# Patient Record
Sex: MALE | Race: BLACK OR AFRICAN AMERICAN | NOT HISPANIC OR LATINO | URBAN - METROPOLITAN AREA
[De-identification: names, ages, dates, MRNs, and addresses within clinical notes are randomized per-mention and may not be internally consistent; named-entity substitution may affect disease eponyms.]

---

## 2018-01-08 ENCOUNTER — EMERGENCY (EMERGENCY)
Facility: HOSPITAL | Age: 23
LOS: 1 days | Discharge: HOME | End: 2018-01-08
Admitting: EMERGENCY MEDICINE
Payer: MEDICAID

## 2018-01-08 VITALS
SYSTOLIC BLOOD PRESSURE: 147 MMHG | TEMPERATURE: 98 F | DIASTOLIC BLOOD PRESSURE: 77 MMHG | OXYGEN SATURATION: 98 % | HEART RATE: 60 BPM | RESPIRATION RATE: 18 BRPM

## 2018-01-08 PROCEDURE — 93010 ELECTROCARDIOGRAM REPORT: CPT | Mod: 59

## 2018-01-08 PROCEDURE — 99284 EMERGENCY DEPT VISIT MOD MDM: CPT | Mod: 25

## 2018-01-08 PROCEDURE — 71046 X-RAY EXAM CHEST 2 VIEWS: CPT | Mod: 26

## 2018-01-08 RX ORDER — IBUPROFEN 200 MG
600 TABLET ORAL ONCE
Qty: 0 | Refills: 0 | Status: COMPLETED | OUTPATIENT
Start: 2018-01-08 | End: 2018-01-08

## 2018-01-08 RX ADMIN — Medication 600 MILLIGRAM(S): at 22:14

## 2018-01-08 NOTE — ED PROVIDER NOTE - PROGRESS NOTE DETAILS
Pt remains stable in ED, ekg and cxr unremarkable, likely muscle strain vs costochondritis, pt to f/u with PMD in 2-3 days for post hospital visit.

## 2018-01-08 NOTE — ED PROVIDER NOTE - CARDIAC, MLM
Normal rate, regular rhythm.  Heart sounds S1, S2.  No murmurs, rubs or gallops, no le edema b/l, + lower sternal ttp also reproducible with lateral rotation of trunk., + ttp to right anterior lower intercostal space, no bony deformity noted.

## 2018-01-08 NOTE — ED PROVIDER NOTE - MEDICAL DECISION MAKING DETAILS
19 y/o M with cp/rib pain - likely muscle strain vs costochondritis, low risk acs and PERC negative - will get ekg and cxr to r/o any arrhythmia, pulm or abnormal osseous pathology. Also with hemorrhoid on exam likely cause of rectal pain, advised supportive care

## 2018-01-08 NOTE — ED PROVIDER NOTE - NONTENDER LOCATION
left upper quadrant/right costovertebral angle/periumbilical/left costovertebral angle/right upper quadrant/right lower quadrant/left lower quadrant/suprapubic

## 2018-01-08 NOTE — ED ADULT TRIAGE NOTE - CHIEF COMPLAINT QUOTE
p/t with multiple complaints, c/o of rectal pain , chest discomfort rt upper quad pain and back pain for few weeks nad noted

## 2018-01-08 NOTE — ED PROVIDER NOTE - CHPI ED SYMPTOMS NEG
no dizziness/no diaphoresis/no fever/no shortness of breath/no nausea/no syncope/no chills/no cough/no vomiting/no back pain

## 2018-01-08 NOTE — ED PROVIDER NOTE - GASTROINTESTINAL NEGATIVE STATEMENT, MLM
+ rectal pain at times - none actively, no abdominal pain, no bloating, no constipation, no diarrhea, no nausea and no vomiting.

## 2018-01-08 NOTE — ED PROVIDER NOTE - CARE PLAN
Principal Discharge DX:	Chest pain  Instructions for follow-up, activity and diet:	Take Motrin 600mg every 8 hrs with food for pain.  Follow up with PMD within 48-72 hrs.  Increase water and fiber in diet, use sitz baths over the counter if pain in rectum persists. Any worsening pain, swelling, weakness, numbness return to ED.

## 2018-01-08 NOTE — ED PROVIDER NOTE - MUSCULOSKELETAL, MLM
see cardiac exam. Spine appears normal, range of motion is not limited, no other muscle or joint tenderness

## 2018-01-08 NOTE — ED PROVIDER NOTE - PLAN OF CARE
Take Motrin 600mg every 8 hrs with food for pain.  Follow up with PMD within 48-72 hrs.  Increase water and fiber in diet, use sitz baths over the counter if pain in rectum persists. Any worsening pain, swelling, weakness, numbness return to ED.

## 2018-01-08 NOTE — ED PROVIDER NOTE - OBJECTIVE STATEMENT
21 y/o M no PMH c/o sternal nonradiating nonexertional constant CP x 3 days, worse with lateral rotation of trunk as well as intermittent right lower anterior rib pain x months, worse with movement and palpation. Pt also noes intermittently he gets rectal pain right before a bm, not every time, has been going on for months, never evaluated. Admits to straining with BM at times. No association of sxs with PO intake. Denies fever, chills, recent illness, diaphoresis, lightheadedness/syncope, SOB, cough, abdominal pain (despite triage note), n/v/d, dysuria, le edema, recent travel, hormone use, h/o DVT/PE, FHx heart disease, recent trauma.